# Patient Record
Sex: FEMALE | Race: WHITE | NOT HISPANIC OR LATINO
[De-identification: names, ages, dates, MRNs, and addresses within clinical notes are randomized per-mention and may not be internally consistent; named-entity substitution may affect disease eponyms.]

---

## 2021-04-18 ENCOUNTER — RX RENEWAL (OUTPATIENT)
Age: 56
End: 2021-04-18

## 2021-04-19 ENCOUNTER — NON-APPOINTMENT (OUTPATIENT)
Age: 56
End: 2021-04-19

## 2021-04-19 DIAGNOSIS — J35.01 CHRONIC TONSILLITIS: ICD-10-CM

## 2021-04-19 DIAGNOSIS — R73.01 IMPAIRED FASTING GLUCOSE: ICD-10-CM

## 2021-04-19 DIAGNOSIS — Z87.01 PERSONAL HISTORY OF PNEUMONIA (RECURRENT): ICD-10-CM

## 2021-04-19 DIAGNOSIS — M85.80 OTHER SPECIFIED DISORDERS OF BONE DENSITY AND STRUCTURE, UNSPECIFIED SITE: ICD-10-CM

## 2021-04-19 DIAGNOSIS — Z78.9 OTHER SPECIFIED HEALTH STATUS: ICD-10-CM

## 2021-04-19 DIAGNOSIS — Z80.1 FAMILY HISTORY OF MALIGNANT NEOPLASM OF TRACHEA, BRONCHUS AND LUNG: ICD-10-CM

## 2021-04-19 DIAGNOSIS — Z92.89 PERSONAL HISTORY OF OTHER MEDICAL TREATMENT: ICD-10-CM

## 2021-04-19 DIAGNOSIS — Z81.1 FAMILY HISTORY OF ALCOHOL ABUSE AND DEPENDENCE: ICD-10-CM

## 2021-04-19 DIAGNOSIS — R07.2 PRECORDIAL PAIN: ICD-10-CM

## 2021-04-19 RX ORDER — CETIRIZINE HCL 10 MG
10 TABLET ORAL
Refills: 0 | Status: ACTIVE | COMMUNITY

## 2021-05-08 ENCOUNTER — RX CHANGE (OUTPATIENT)
Age: 56
End: 2021-05-08

## 2021-05-08 ENCOUNTER — RX RENEWAL (OUTPATIENT)
Age: 56
End: 2021-05-08

## 2021-05-19 ENCOUNTER — RX CHANGE (OUTPATIENT)
Age: 56
End: 2021-05-19

## 2021-05-28 ENCOUNTER — RX RENEWAL (OUTPATIENT)
Age: 56
End: 2021-05-28

## 2021-07-05 ENCOUNTER — RX RENEWAL (OUTPATIENT)
Age: 56
End: 2021-07-05

## 2021-08-19 ENCOUNTER — RX RENEWAL (OUTPATIENT)
Age: 56
End: 2021-08-19

## 2021-08-29 ENCOUNTER — RX RENEWAL (OUTPATIENT)
Age: 56
End: 2021-08-29

## 2021-08-30 ENCOUNTER — RX RENEWAL (OUTPATIENT)
Age: 56
End: 2021-08-30

## 2021-10-10 ENCOUNTER — RX RENEWAL (OUTPATIENT)
Age: 56
End: 2021-10-10

## 2021-10-27 ENCOUNTER — RESULT CHARGE (OUTPATIENT)
Age: 56
End: 2021-10-27

## 2021-10-28 PROBLEM — Z00.00 ENCOUNTER FOR PREVENTIVE HEALTH EXAMINATION: Status: ACTIVE | Noted: 2021-10-28

## 2021-10-28 PROBLEM — Z13.6 SCREENING FOR CARDIOVASCULAR CONDITION: Status: ACTIVE | Noted: 2021-10-28

## 2021-10-29 ENCOUNTER — APPOINTMENT (OUTPATIENT)
Dept: FAMILY MEDICINE | Facility: CLINIC | Age: 56
End: 2021-10-29
Payer: COMMERCIAL

## 2021-10-29 ENCOUNTER — NON-APPOINTMENT (OUTPATIENT)
Age: 56
End: 2021-10-29

## 2021-10-29 VITALS
HEIGHT: 62 IN | TEMPERATURE: 98.2 F | BODY MASS INDEX: 29.44 KG/M2 | DIASTOLIC BLOOD PRESSURE: 82 MMHG | HEART RATE: 79 BPM | OXYGEN SATURATION: 98 % | SYSTOLIC BLOOD PRESSURE: 138 MMHG | WEIGHT: 160 LBS

## 2021-10-29 DIAGNOSIS — B00.1 HERPESVIRAL VESICULAR DERMATITIS: ICD-10-CM

## 2021-10-29 DIAGNOSIS — Z87.39 PERSONAL HISTORY OF OTHER DISEASES OF THE MUSCULOSKELETAL SYSTEM AND CONNECTIVE TISSUE: ICD-10-CM

## 2021-10-29 DIAGNOSIS — J01.90 ACUTE SINUSITIS, UNSPECIFIED: ICD-10-CM

## 2021-10-29 DIAGNOSIS — Z13.6 ENCOUNTER FOR SCREENING FOR CARDIOVASCULAR DISORDERS: ICD-10-CM

## 2021-10-29 DIAGNOSIS — K57.90 DIVERTICULOSIS OF INTESTINE, PART UNSPECIFIED, W/OUT PERFORATION OR ABSCESS W/OUT BLEEDING: ICD-10-CM

## 2021-10-29 DIAGNOSIS — Z00.00 ENCOUNTER FOR GENERAL ADULT MEDICAL EXAMINATION W/OUT ABNORMAL FINDINGS: ICD-10-CM

## 2021-10-29 DIAGNOSIS — Z86.69 PERSONAL HISTORY OF OTHER DISEASES OF THE NERVOUS SYSTEM AND SENSE ORGANS: ICD-10-CM

## 2021-10-29 PROCEDURE — 99386 PREV VISIT NEW AGE 40-64: CPT | Mod: 25

## 2021-10-29 PROCEDURE — 93000 ELECTROCARDIOGRAM COMPLETE: CPT

## 2021-10-29 RX ORDER — BUPROPION HYDROCHLORIDE 300 MG/1
300 TABLET, EXTENDED RELEASE ORAL DAILY
Refills: 0 | Status: DISCONTINUED | COMMUNITY
End: 2021-10-29

## 2021-10-29 RX ORDER — TRAMADOL HYDROCHLORIDE 100 MG/1
100 TABLET, COATED ORAL
Refills: 0 | Status: DISCONTINUED | COMMUNITY
End: 2021-10-29

## 2021-10-29 RX ORDER — FLUTICASONE PROPIONATE 50 UG/1
50 SPRAY, METERED NASAL TWICE DAILY
Qty: 1 | Refills: 6 | Status: ACTIVE | COMMUNITY
Start: 2021-10-29 | End: 1900-01-01

## 2021-10-29 NOTE — REVIEW OF SYSTEMS
[Negative] : Heme/Lymph [Earache] : earache [Nasal Discharge] : nasal discharge [Sore Throat] : sore throat [Postnasal Drip] : postnasal drip

## 2021-10-29 NOTE — HISTORY OF PRESENT ILLNESS
[de-identified] : Patient is here for yearly physical. She denies any new complaints . She is UTD with all preventative testing , dentist, Optho and Derm .\par Patient is eating healthy  and exercising . Patient is Covid Vaccinated. \par Patient is fasting for physical labs today\par Pt has had sinus congestion and recurrent cold sore , she takes flonase and claritin

## 2021-10-29 NOTE — HEALTH RISK ASSESSMENT
[Good] : ~his/her~  mood as  good [Yes] : Yes [2 - 4 times a month (2 pts)] : 2-4 times a month (2 points) [3 or 4 (1 pt)] : 3 or 4  (1 point) [Never (0 pts)] : Never (0 points) [Patient reported mammogram was normal] : Patient reported mammogram was normal [Patient reported colonoscopy was normal] : Patient reported colonoscopy was normal [1] : 2) Feeling down, depressed, or hopeless for several days (1) [With Family] : lives with family [# of Members in Household ___] :  household currently consist of [unfilled] member(s) [Employed] : employed [College] : College [] :  [# Of Children ___] : has [unfilled] children [Feels Safe at Home] : Feels safe at home [Reports normal functional visual acuity (ie: able to read med bottle)] : Reports normal functional visual acuity [Smoke Detector] : smoke detector [Carbon Monoxide Detector] : carbon monoxide detector [Safety elements used in home] : safety elements used in home [Seat Belt] :  uses seat belt [Sunscreen] : uses sunscreen [] : No [de-identified] : low level   active  [de-identified] : moderately healthy  diet  [Sexually Active] : not sexually active [Reports changes in hearing] : Reports no changes in hearing [Reports changes in vision] : Reports no changes in vision [Reports changes in dental health] : Reports no changes in dental health [MammogramDate] : 6/2021 [MammogramComments] : dense breast  [PapSmearComments] : s/p hsyterectomy  [PapSmearDate] : >5 years  [BoneDensityDate] : 2020 [BoneDensityComments] : osteopenia  [ColonoscopyDate] : 2021 [FreeTextEntry2] : [facility]

## 2021-10-29 NOTE — PHYSICAL EXAM
[No Acute Distress] : no acute distress [Well Nourished] : well nourished [Well Developed] : well developed [Well-Appearing] : well-appearing [Normal Sclera/Conjunctiva] : normal sclera/conjunctiva [PERRL] : pupils equal round and reactive to light [EOMI] : extraocular movements intact [Normal Outer Ear/Nose] : the outer ears and nose were normal in appearance [Normal Oropharynx] : the oropharynx was normal [No JVD] : no jugular venous distention [No Lymphadenopathy] : no lymphadenopathy [Supple] : supple [Thyroid Normal, No Nodules] : the thyroid was normal and there were no nodules present [No Respiratory Distress] : no respiratory distress  [No Accessory Muscle Use] : no accessory muscle use [Clear to Auscultation] : lungs were clear to auscultation bilaterally [Normal Rate] : normal rate  [Regular Rhythm] : with a regular rhythm [Normal S1, S2] : normal S1 and S2 [No Murmur] : no murmur heard [No Carotid Bruits] : no carotid bruits [No Abdominal Bruit] : a ~M bruit was not heard ~T in the abdomen [No Varicosities] : no varicosities [Pedal Pulses Present] : the pedal pulses are present [No Edema] : there was no peripheral edema [No Palpable Aorta] : no palpable aorta [No Extremity Clubbing/Cyanosis] : no extremity clubbing/cyanosis [Soft] : abdomen soft [Non Tender] : non-tender [Non-distended] : non-distended [No Masses] : no abdominal mass palpated [No HSM] : no HSM [Normal Bowel Sounds] : normal bowel sounds [Normal Posterior Cervical Nodes] : no posterior cervical lymphadenopathy [Normal Anterior Cervical Nodes] : no anterior cervical lymphadenopathy [No CVA Tenderness] : no CVA  tenderness [No Spinal Tenderness] : no spinal tenderness [No Joint Swelling] : no joint swelling [Grossly Normal Strength/Tone] : grossly normal strength/tone [No Rash] : no rash [Coordination Grossly Intact] : coordination grossly intact [No Focal Deficits] : no focal deficits [Normal Gait] : normal gait [Deep Tendon Reflexes (DTR)] : deep tendon reflexes were 2+ and symmetric [Normal Affect] : the affect was normal [Normal Insight/Judgement] : insight and judgment were intact [de-identified] : nasal turbinates edematous , cold sore noted on inner right nostril , TM effusion bilaterally

## 2021-10-29 NOTE — PLAN
[FreeTextEntry1] : Fasting labs were reviewed  today . ECG reviewed and WNL \par I have advised to use flonase , saline , azelastine with decongestants and antihistamines for allergic rhinitis \par Will start lexapro and return in 2-3 months for followup \par Pt will start Valtrex for recurrent cold sores \par Discussed Hyperlipidemia , levels have increased 40 points since 2020, Pt will work harder on diet and exercise and recheck in 6 months \par Advised to undergo any preventative testing that is overdue . \par Patient will continue healthy eating and exercise and followup in one year for PE\par

## 2021-11-08 ENCOUNTER — NON-APPOINTMENT (OUTPATIENT)
Age: 56
End: 2021-11-08

## 2021-12-16 RX ORDER — VALACYCLOVIR 1 G/1
1 TABLET, FILM COATED ORAL DAILY
Qty: 30 | Refills: 1 | Status: ACTIVE | COMMUNITY
Start: 2021-10-29 | End: 1900-01-01

## 2022-01-08 ENCOUNTER — RX RENEWAL (OUTPATIENT)
Age: 57
End: 2022-01-08

## 2022-05-15 ENCOUNTER — FORM ENCOUNTER (OUTPATIENT)
Age: 57
End: 2022-05-15

## 2022-05-16 ENCOUNTER — FORM ENCOUNTER (OUTPATIENT)
Age: 57
End: 2022-05-16

## 2022-05-17 ENCOUNTER — FORM ENCOUNTER (OUTPATIENT)
Age: 57
End: 2022-05-17

## 2022-05-18 ENCOUNTER — NON-APPOINTMENT (OUTPATIENT)
Age: 57
End: 2022-05-18

## 2022-05-20 ENCOUNTER — APPOINTMENT (OUTPATIENT)
Dept: FAMILY MEDICINE | Facility: CLINIC | Age: 57
End: 2022-05-20
Payer: COMMERCIAL

## 2022-05-20 ENCOUNTER — RX RENEWAL (OUTPATIENT)
Age: 57
End: 2022-05-20

## 2022-05-20 VITALS
HEART RATE: 76 BPM | OXYGEN SATURATION: 96 % | DIASTOLIC BLOOD PRESSURE: 80 MMHG | BODY MASS INDEX: 30.36 KG/M2 | WEIGHT: 165 LBS | HEIGHT: 62 IN | TEMPERATURE: 97.6 F | SYSTOLIC BLOOD PRESSURE: 130 MMHG

## 2022-05-20 PROCEDURE — 99214 OFFICE O/P EST MOD 30 MIN: CPT

## 2022-05-20 RX ORDER — FLUTICASONE PROPIONATE 50 UG/1
50 SPRAY, METERED NASAL
Refills: 0 | Status: DISCONTINUED | COMMUNITY
End: 2022-05-20

## 2022-05-20 NOTE — PHYSICAL EXAM
[No Carotid Bruits] : no carotid bruits [No Abdominal Bruit] : a ~M bruit was not heard ~T in the abdomen [No Edema] : there was no peripheral edema [Normal] : no rash [Coordination Grossly Intact] : coordination grossly intact [No Focal Deficits] : no focal deficits [Normal Gait] : normal gait [Normal Affect] : the affect was normal [Normal Insight/Judgement] : insight and judgment were intact

## 2022-05-20 NOTE — ASSESSMENT
[FreeTextEntry1] : Patient is a 58yo female with PMH HTN, HLD, hypothyroidism, depression, anxiety, Vitamin D deficiency who presents to the office for follow up.  \par \par HTN\par - Continue Metoprolol Succinate 50mg daily.\par - BP borderline in office at 130/80, was going to repeat but pt had coughing fit secondary to mask so deferred for now as would likely be elevated from coughing fit.\par - Monitor BP at home, keep log, alert office if too high/too low.\par \par HLD\par - Cholesterol improved since last check in 10/2021.\par - 10 year ASCVD risk 3.5-6.0%.\par - Will continue to work on diet, limit/avoid greasy foods, fried foods, fatty foods, and saturated fat in your diet and try and eat more lean proteins, and increase exercise.\par \par Hypothyroidism\par - TSH mildly elevated, FT4 WNL.\par - Pt denies worsening hypothyroid symptoms.\par - Pt will continue Levothyroxine 25mcg daily and will repeat TSH/FT4 in 1 month, will re-evaluate need for dose adjustment at that time based on blood work results and symptoms.\par \par Depression/Anxiety\par - Depression symptoms improved on Lexapro 10mg daily (just started in 10/2021), however room for improvement.\par - Increase dose of Lexapro to 15mg daily.\par - Insomnia symptoms also intermittently worse, pt would like to increase dose of Trazodone, new RX for Trazodone 150mg sent to pharmacy.\par - RX for Xanax 0.25mg #15 sent to pharmacy, to be used for flight anxiety and travel anxiety sparingly only as needed.  Do not drink alcohol, drive, or operate heavy machinery when taking Xanax as it causes drowsiness.\par \par RTO in 6 months for fasting labs/CPE.\par Call the office or go to the ED immediately if you develop new, worsening or concerning symptoms including high fever, severe headache/worst headache of your life, confusion, dizziness/lightheadedness, loss of consciousness, severe chest pain, difficulty breathing, shortness of breath, severe abdominal pain, excessive vomiting/diarrhea, inability to feel/move the extremities, or any other concerning symptoms.\par

## 2022-05-20 NOTE — HEALTH RISK ASSESSMENT
[Former] : Former [Yes] : Yes [2 - 3 times a week (3 pts)] : 2 - 3  times a week (3 points) [1 or 2 (0 pts)] : 1 or 2 (0 points) [Never (0 pts)] : Never (0 points) [No] : In the past 12 months have you used drugs other than those required for medical reasons? No [No falls in past year] : Patient reported no falls in the past year [1] : 2) Feeling down, depressed, or hopeless for several days (1) [PHQ-2 Positive] : PHQ-2 Positive [Several Days (1)] : 4.) Feeling tired or having little energy? Several days [1/2 of Days or More (2)] : 7.) Trouble concentrating on things, such as reading a newspaper or watching television? Half the days or more [Not at All (0)] : 8.) Moving or speaking so slowly that other people could have noticed, or the opposite, moving or speaking faster than usual? Not at all [Mild] : severity of depression is mild [Not at all] : How difficult have these problems made it for you to do your work, take care of things at home, or get along with people? Not at all [I have developed a follow-up plan documented below in the note.] : I have developed a follow-up plan documented below in the note. [With Patient/Caregiver] : , with patient/caregiver [YearQuit] : 2018 [Audit-CScore] : 3 [de-identified] : active, walks [de-identified] : room for improvement, carbohydrates [KIN4Diocr] : 2 [FreeTextEntry4] : needs.

## 2022-05-20 NOTE — PLAN
[FreeTextEntry1] : See assessment.\par Increase Lexapro to 15mg daily.\par Increase Trazodone to 150mg.\par Repeat thyroid testing in 1 month to evaluate need for medication adjustment.

## 2022-06-28 ENCOUNTER — FORM ENCOUNTER (OUTPATIENT)
Age: 57
End: 2022-06-28

## 2022-07-19 DIAGNOSIS — Z87.898 PERSONAL HISTORY OF OTHER SPECIFIED CONDITIONS: ICD-10-CM

## 2022-07-19 DIAGNOSIS — R53.83 OTHER FATIGUE: ICD-10-CM

## 2022-07-19 DIAGNOSIS — Z12.31 ENCOUNTER FOR SCREENING MAMMOGRAM FOR MALIGNANT NEOPLASM OF BREAST: ICD-10-CM

## 2022-07-20 ENCOUNTER — FORM ENCOUNTER (OUTPATIENT)
Age: 57
End: 2022-07-20

## 2022-07-20 ENCOUNTER — RX CHANGE (OUTPATIENT)
Age: 57
End: 2022-07-20

## 2022-07-20 RX ORDER — AZELASTINE HYDROCHLORIDE 137 UG/1
0.1 SPRAY, METERED NASAL TWICE DAILY
Qty: 1 | Refills: 4 | Status: ACTIVE | COMMUNITY
Start: 2021-10-29 | End: 1900-01-01

## 2022-07-29 ENCOUNTER — NON-APPOINTMENT (OUTPATIENT)
Age: 57
End: 2022-07-29

## 2022-11-02 ENCOUNTER — RESULT CHARGE (OUTPATIENT)
Age: 57
End: 2022-11-02

## 2022-11-03 ENCOUNTER — APPOINTMENT (OUTPATIENT)
Dept: FAMILY MEDICINE | Facility: CLINIC | Age: 57
End: 2022-11-03

## 2022-11-03 ENCOUNTER — FORM ENCOUNTER (OUTPATIENT)
Age: 57
End: 2022-11-03

## 2022-11-03 ENCOUNTER — NON-APPOINTMENT (OUTPATIENT)
Age: 57
End: 2022-11-03

## 2022-11-03 VITALS
WEIGHT: 180 LBS | HEART RATE: 88 BPM | DIASTOLIC BLOOD PRESSURE: 68 MMHG | BODY MASS INDEX: 33.13 KG/M2 | OXYGEN SATURATION: 97 % | SYSTOLIC BLOOD PRESSURE: 122 MMHG | HEIGHT: 62 IN | TEMPERATURE: 97.2 F

## 2022-11-03 PROCEDURE — 93000 ELECTROCARDIOGRAM COMPLETE: CPT

## 2022-11-03 PROCEDURE — 99396 PREV VISIT EST AGE 40-64: CPT | Mod: 25

## 2022-11-03 RX ORDER — ADHESIVE TAPE 3"X 2.3 YD
50 MCG TAPE, NON-MEDICATED TOPICAL
Refills: 0 | Status: DISCONTINUED | COMMUNITY
End: 2022-11-03

## 2022-11-03 RX ORDER — ADALIMUMAB 40MG/0.4ML
40 KIT SUBCUTANEOUS
Qty: 2 | Refills: 0 | Status: ACTIVE | COMMUNITY
Start: 2022-10-21

## 2022-11-03 RX ORDER — ADALIMUMAB 20MG/0.4ML
20 KIT SUBCUTANEOUS
Refills: 0 | Status: DISCONTINUED | COMMUNITY
End: 2022-11-03

## 2022-11-03 NOTE — REVIEW OF SYSTEMS
[Fever] : no fever [Fatigue] : fatigue [Chills] : no chills [Recent Change In Weight] : ~T recent weight change [Suicidal] : not suicidal [Insomnia] : no insomnia [Anxiety] : no anxiety [Depression] : depression [Negative] : Heme/Lymph

## 2022-11-03 NOTE — HISTORY OF PRESENT ILLNESS
[FreeTextEntry1] : CPE. [de-identified] : Patient is a 56yo female with PMH HTN, HLD, hypothyroidism, depression, anxiety, Vitamin D deficiency, ankylosis spondylitis who presents to the office for CPE. \par  \par Last CPE:  10/29/2021, Prabhu Reddy. \par GYN Exam:  s/p total hysterectomy, does not follow regularly.\par Mammogram:  07/2022, BIRADS-2.  Breast US WNL. \par DEXA:  11/2020, normal. \par Colonoscopy:  6 months ago, Dr. Frederick Chan; s/p polypectomy, hemorrhoid ,diverticulosis; repeat 5 years.\par Ophthalmology:  UTD, last visit Dr. Montaño 06/2022; uveitis.\par Dermatology:  UTD.\par Dentist:  UTD.\par Shingles:  will check with Dr. Hendrix, Rheumatology.\par Flu:  plans to receive next week.\par Tdap:  states got recently, unsure exactly when.  Will obtain records. \par PNA:  will discuss with Dr. Hendrix.\par COVID:  Pfizer x2, boosters x2.\par LMP:  s/p Hysterectomy. \par \par Pt states she is always tired.  States she always wants to take a nap after work.  Has history of depression/anxiety, Lexapro was increased from 10mg daily to 1.5mg daily in May but has not noticed this to help her fatigue.\par Pt has intermittent dull headaches, band like.\par \par Pt follows with Dr. Hendrix, Rheumatology, for ankylosis spondylitis.  Has appointment next week.\par Had b/w done prior for Dr. Hendrix's appointment and states ESR and CRP are elevated.  Does not have results with her.\par \par Pt with steady weight gain, 15lb since 05/2022.

## 2022-11-03 NOTE — ASSESSMENT
[FreeTextEntry1] : Patient is a 56yo female with PMH HTN, HLD, hypothyroidism, depression, anxiety, Vitamin D deficiency, ankylosis spondylitis who presents to the office for CPE. \par \par Health Maintenance\par - Due for Shingrix, possible PNA 2/2 hx ankylosis spondylitis, will discuss with Rheumatology.\par - Due for Flu shot, pt plans to take at work next week.\par - UTD with remainder of HCM.\par - EKG NSR, no acute ST/T changes, no arrhythmias.  States she has mild SOB 2/2 mask and weight gain, will consider cardiac evaluation.\par - Pt provided with RX for b/w, plans to have done tomorrow.\par - Weight gain noted, pt encouraged lifestyle modification.\par - Eat plenty of fruits and vegetables, especially deeply colored fruits/vegetables (such as leafy greens, peaches) that are more nutrient-dense.  Continue to work hard on diet and exercise, limiting/avoiding saturated fat, fatty foods, greasy foods, red meats, white flour-based carbohydrates (cookies, cakes, white bread, white rice), and added sugars.  Chose whole grain foods and products made with whole grains over refined grains and white flour-based carbohydrates.  Avoid beverages and food with added sugar.  Limit salt intake to improve blood pressure.  Limit alcohol intake.\par - Try and incorporate 30 minutes of aerobic exercise to your daily routine.\par \par Depression/Anxiety\par - Continue Lexapro 15mg daily for now.\par - Pt to discuss current symptoms of fatigue with Rheumatology, will consider antidepressant adjustment if there are no underlying rheumatologic reasons for fatigue.\par - Consider therapist f/u.\par - Alert office if symptoms worsen.\par \par Hypothyroidism\par - Continue Levothyroxine 25mcg daily.\par - Will check thyroid functioning on labs tomorrow.\par \par HTN\par - BP at goal in office.\par - Continue Metoprolol 50mg daily.\par - Monitor blood pressure at home, keep log, alert office if too high/too low.\par - Alert office if you develop any new, worsening or concerning symptoms including headache, vision changes, dizziness, loss of consciousness, chest pain, shortness of breath, or lower extremity edema.\par \par Continue f/u with Rheumatology for Ankylosis Spondylitis.\par \par Call the office or go to the ED immediately if you develop new, worsening or concerning symptoms including high fever, severe headache/worst headache of your life, confusion, dizziness/lightheadedness, loss of consciousness, severe chest pain, difficulty breathing, shortness of breath, severe abdominal pain, excessive vomiting/diarrhea, inability to feel/move the extremities, or any other concerning symptoms.\par

## 2022-11-03 NOTE — HEALTH RISK ASSESSMENT
[Good] : ~his/her~  mood as  good [Former] : Former [Yes] : Yes [2 - 3 times a week (3 pts)] : 2 - 3  times a week (3 points) [1 or 2 (0 pts)] : 1 or 2 (0 points) [Never (0 pts)] : Never (0 points) [No] : In the past 12 months have you used drugs other than those required for medical reasons? No [No falls in past year] : Patient reported no falls in the past year [0] : 1) Little interest or pleasure doing things: Not at all (0) [1] : 2) Feeling down, depressed, or hopeless for several days (1) [PHQ-2 Negative - No further assessment needed] : PHQ-2 Negative - No further assessment needed [I have developed a follow-up plan documented below in the note.] : I have developed a follow-up plan documented below in the note. [Patient reported mammogram was normal] : Patient reported mammogram was normal [Patient reported colonoscopy was abnormal] : Patient reported colonoscopy was abnormal [HIV test declined] : HIV test declined [Hepatitis C test declined] : Hepatitis C test declined [None] : None [With Family] : lives with family [Employed] : employed [High School] : high school [Single] : single [# Of Children ___] : has [unfilled] children [Feels Safe at Home] : Feels safe at home [Fully functional (bathing, dressing, toileting, transferring, walking, feeding)] : Fully functional (bathing, dressing, toileting, transferring, walking, feeding) [Fully functional (using the telephone, shopping, preparing meals, housekeeping, doing laundry, using] : Fully functional and needs no help or supervision to perform IADLs (using the telephone, shopping, preparing meals, housekeeping, doing laundry, using transportation, managing medications and managing finances) [Smoke Detector] : smoke detector [Carbon Monoxide Detector] : carbon monoxide detector [Safety elements used in home] : safety elements used in home [Seat Belt] :  uses seat belt [Sunscreen] : uses sunscreen [With Patient/Caregiver] : , with patient/caregiver [Reviewed no changes] : Reviewed, no changes [I will adhere to the patient's wishes.] : I will adhere to the patient's wishes. [de-identified] : on and off smoker for years [YearQuit] : 2018 [Audit-CScore] : 3 [de-identified] : active at work, limited formal exercise [de-identified] : well balanced [OTP3Ygdjl] : 1 [EyeExamDate] : 06/2022 [Change in mental status noted] : No change in mental status noted [Language] : denies difficulty with language [Sexually Active] : not sexually active [High Risk Behavior] : no high risk behavior [Reports changes in hearing] : Reports no changes in hearing [Reports changes in vision] : Reports no changes in vision [Reports changes in dental health] : Reports no changes in dental health [Travel to Developing Areas] : does not  travel to developing areas [MammogramDate] : 07/2022 [ColonoscopyDate] : 05/2022 [ColonoscopyComments] : s/p polypectomy, repeat 5 years. 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

## 2022-11-10 ENCOUNTER — NON-APPOINTMENT (OUTPATIENT)
Age: 57
End: 2022-11-10

## 2022-11-15 ENCOUNTER — NON-APPOINTMENT (OUTPATIENT)
Age: 57
End: 2022-11-15

## 2022-11-15 DIAGNOSIS — R73.03 PREDIABETES.: ICD-10-CM

## 2022-12-08 ENCOUNTER — RX ONLY (RX ONLY)
Age: 57
End: 2022-12-08

## 2022-12-08 ENCOUNTER — OFFICE (OUTPATIENT)
Dept: URBAN - METROPOLITAN AREA CLINIC 12 | Facility: CLINIC | Age: 57
Setting detail: OPHTHALMOLOGY
End: 2022-12-08
Payer: COMMERCIAL

## 2022-12-08 DIAGNOSIS — H18.513: ICD-10-CM

## 2022-12-08 DIAGNOSIS — H40.033: ICD-10-CM

## 2022-12-08 DIAGNOSIS — H00.12: ICD-10-CM

## 2022-12-08 DIAGNOSIS — H25.13: ICD-10-CM

## 2022-12-08 DIAGNOSIS — H20.011: ICD-10-CM

## 2022-12-08 PROCEDURE — 92014 COMPRE OPH EXAM EST PT 1/>: CPT | Performed by: OPHTHALMOLOGY

## 2022-12-08 ASSESSMENT — REFRACTION_CURRENTRX
OS_CYLINDER: -0.25
OS_AXIS: 003
OS_ADD: +2.00
OD_ADD: +2.00
OS_OVR_VA: 20/
OD_CYLINDER: -0.50
OS_SPHERE: +2.25
OD_SPHERE: +2.00
OD_OVR_VA: 20/
OD_VPRISM_DIRECTION: PROGS
OD_AXIS: 100
OS_VPRISM_DIRECTION: PROGS

## 2022-12-08 ASSESSMENT — REFRACTION_AUTOREFRACTION
OS_CYLINDER: -0.50
OS_AXIS: 081
OD_AXIS: 106
OS_SPHERE: +3.25
OD_SPHERE: +3.25
OD_CYLINDER: -1.00

## 2022-12-08 ASSESSMENT — KERATOMETRY
OS_K2POWER_DIOPTERS: 46.25
METHOD_AUTO_MANUAL: AUTO
OD_AXISANGLE_DEGREES: 030
OS_AXISANGLE_DEGREES: 096
OS_K1POWER_DIOPTERS: 46.00
OD_K2POWER_DIOPTERS: 46.50
OD_K1POWER_DIOPTERS: 45.75

## 2022-12-08 ASSESSMENT — AXIALLENGTH_DERIVED
OD_AL: 21.718
OS_AL: 21.6358

## 2022-12-08 ASSESSMENT — CORNEAL DYSTROPHY - POSTERIOR
OS_POSTERIORDYSTROPHY: 1+ 2+ GUTTATA
OD_POSTERIORDYSTROPHY: 1+ 2+ GUTTATA

## 2022-12-08 ASSESSMENT — TONOMETRY
OS_IOP_MMHG: 15
OD_IOP_MMHG: 14

## 2022-12-08 ASSESSMENT — VISUAL ACUITY
OS_BCVA: 20/40
OD_BCVA: 20/20-1

## 2022-12-08 ASSESSMENT — CONFRONTATIONAL VISUAL FIELD TEST (CVF)
OS_FINDINGS: FULL
OD_FINDINGS: FULL

## 2022-12-08 ASSESSMENT — SPHEQUIV_DERIVED
OD_SPHEQUIV: 2.75
OS_SPHEQUIV: 3

## 2022-12-15 ENCOUNTER — OFFICE (OUTPATIENT)
Dept: URBAN - METROPOLITAN AREA CLINIC 12 | Facility: CLINIC | Age: 57
Setting detail: OPHTHALMOLOGY
End: 2022-12-15
Payer: COMMERCIAL

## 2022-12-15 DIAGNOSIS — H20.011: ICD-10-CM

## 2022-12-15 DIAGNOSIS — H18.513: ICD-10-CM

## 2022-12-15 DIAGNOSIS — H43.393: ICD-10-CM

## 2022-12-15 DIAGNOSIS — H25.13: ICD-10-CM

## 2022-12-15 DIAGNOSIS — H40.033: ICD-10-CM

## 2022-12-15 PROBLEM — H00.12 CHALAZION; RIGHT LOWER LID: Status: RESOLVED | Noted: 2022-12-08 | Resolved: 2022-12-15

## 2022-12-15 PROCEDURE — 92014 COMPRE OPH EXAM EST PT 1/>: CPT | Performed by: OPHTHALMOLOGY

## 2022-12-15 ASSESSMENT — KERATOMETRY
OS_AXISANGLE_DEGREES: 105
OS_K2POWER_DIOPTERS: 46.25
METHOD_AUTO_MANUAL: AUTO
OD_K2POWER_DIOPTERS: 46.75
OD_K1POWER_DIOPTERS: 46.25
OD_AXISANGLE_DEGREES: 070
OS_K1POWER_DIOPTERS: 46.00

## 2022-12-15 ASSESSMENT — REFRACTION_AUTOREFRACTION
OS_CYLINDER: -0.50
OD_AXIS: 122
OS_AXIS: 080
OS_SPHERE: +3.00
OD_CYLINDER: -1.25
OD_SPHERE: +3.00

## 2022-12-15 ASSESSMENT — VISUAL ACUITY
OS_BCVA: 20/50-1
OD_BCVA: 20/25

## 2022-12-15 ASSESSMENT — REFRACTION_CURRENTRX
OS_ADD: +2.00
OD_SPHERE: +2.00
OS_AXIS: 003
OS_VPRISM_DIRECTION: PROGS
OD_OVR_VA: 20/
OD_CYLINDER: -0.50
OS_SPHERE: +2.25
OD_ADD: +2.00
OS_OVR_VA: 20/
OD_AXIS: 100
OS_CYLINDER: -0.25
OD_VPRISM_DIRECTION: PROGS

## 2022-12-15 ASSESSMENT — AXIALLENGTH_DERIVED
OS_AL: 21.718
OD_AL: 21.7251

## 2022-12-15 ASSESSMENT — CONFRONTATIONAL VISUAL FIELD TEST (CVF)
OD_FINDINGS: FULL
OS_FINDINGS: FULL

## 2022-12-15 ASSESSMENT — SPHEQUIV_DERIVED
OS_SPHEQUIV: 2.75
OD_SPHEQUIV: 2.375

## 2022-12-15 ASSESSMENT — CORNEAL DYSTROPHY - POSTERIOR
OD_POSTERIORDYSTROPHY: 1+ 2+ GUTTATA
OS_POSTERIORDYSTROPHY: 1+ 2+ GUTTATA

## 2022-12-15 ASSESSMENT — TONOMETRY: OS_IOP_MMHG: 12

## 2022-12-16 ENCOUNTER — RX ONLY (RX ONLY)
Age: 57
End: 2022-12-16

## 2022-12-16 ENCOUNTER — OFFICE (OUTPATIENT)
Dept: URBAN - METROPOLITAN AREA CLINIC 88 | Facility: CLINIC | Age: 57
Setting detail: OPHTHALMOLOGY
End: 2022-12-16
Payer: COMMERCIAL

## 2022-12-16 VITALS — HEIGHT: 55 IN

## 2022-12-16 DIAGNOSIS — H20.011: ICD-10-CM

## 2022-12-16 DIAGNOSIS — H35.033: ICD-10-CM

## 2022-12-16 DIAGNOSIS — H43.811: ICD-10-CM

## 2022-12-16 DIAGNOSIS — H43.393: ICD-10-CM

## 2022-12-16 PROCEDURE — 92134 CPTRZ OPH DX IMG PST SGM RTA: CPT | Performed by: OPHTHALMOLOGY

## 2022-12-16 PROCEDURE — 92250 FUNDUS PHOTOGRAPHY W/I&R: CPT | Performed by: OPHTHALMOLOGY

## 2022-12-16 PROCEDURE — 92014 COMPRE OPH EXAM EST PT 1/>: CPT | Performed by: OPHTHALMOLOGY

## 2022-12-16 ASSESSMENT — AXIALLENGTH_DERIVED
OS_AL: 21.718
OD_AL: 21.7251

## 2022-12-16 ASSESSMENT — KERATOMETRY
OD_K2POWER_DIOPTERS: 46.75
OS_K2POWER_DIOPTERS: 46.25
OS_K1POWER_DIOPTERS: 46.00
OD_AXISANGLE_DEGREES: 070
OS_AXISANGLE_DEGREES: 105
METHOD_AUTO_MANUAL: AUTO
OD_K1POWER_DIOPTERS: 46.25

## 2022-12-16 ASSESSMENT — REFRACTION_AUTOREFRACTION
OD_SPHERE: +3.00
OD_CYLINDER: -1.25
OD_AXIS: 122
OS_CYLINDER: -0.50
OS_AXIS: 080
OS_SPHERE: +3.00

## 2022-12-16 ASSESSMENT — CORNEAL DYSTROPHY - POSTERIOR
OS_POSTERIORDYSTROPHY: 1+ 2+ GUTTATA
OD_POSTERIORDYSTROPHY: 1+ 2+ GUTTATA

## 2022-12-16 ASSESSMENT — TONOMETRY: OS_IOP_MMHG: 12

## 2022-12-16 ASSESSMENT — SPHEQUIV_DERIVED
OD_SPHEQUIV: 2.375
OS_SPHEQUIV: 2.75

## 2022-12-16 ASSESSMENT — VISUAL ACUITY
OD_BCVA: 20/20
OS_BCVA: 20/60-1

## 2022-12-16 ASSESSMENT — CONFRONTATIONAL VISUAL FIELD TEST (CVF)
OD_FINDINGS: FULL
OS_FINDINGS: FULL

## 2022-12-22 ENCOUNTER — OFFICE (OUTPATIENT)
Dept: URBAN - METROPOLITAN AREA CLINIC 12 | Facility: CLINIC | Age: 57
Setting detail: OPHTHALMOLOGY
End: 2022-12-22
Payer: COMMERCIAL

## 2022-12-22 DIAGNOSIS — H43.393: ICD-10-CM

## 2022-12-22 DIAGNOSIS — H35.033: ICD-10-CM

## 2022-12-22 DIAGNOSIS — H43.811: ICD-10-CM

## 2022-12-22 DIAGNOSIS — H20.011: ICD-10-CM

## 2022-12-22 PROBLEM — H40.033 NARROW ANGLE GLAUCOMA SUSPECT; BOTH EYES: Status: ACTIVE | Noted: 2022-12-16

## 2022-12-22 PROBLEM — H25.13 CATARACT SENILE NUCLEAR SCLEROSIS; BOTH EYES: Status: ACTIVE | Noted: 2022-12-16

## 2022-12-22 PROCEDURE — 99212 OFFICE O/P EST SF 10 MIN: CPT | Performed by: OPHTHALMOLOGY

## 2022-12-22 ASSESSMENT — REFRACTION_CURRENTRX
OD_ADD: +2.00
OS_OVR_VA: 20/
OD_OVR_VA: 20/
OD_SPHERE: +1.75
OS_VPRISM_DIRECTION: PROGS
OD_VPRISM_DIRECTION: PROGS
OS_AXIS: 017
OS_SPHERE: +2.25
OS_ADD: +2.00
OD_CYLINDER: -0.50
OD_AXIS: 102
OS_CYLINDER: -0.25

## 2022-12-22 ASSESSMENT — CONFRONTATIONAL VISUAL FIELD TEST (CVF)
OS_FINDINGS: FULL
OD_FINDINGS: FULL

## 2022-12-22 ASSESSMENT — KERATOMETRY
OD_K1POWER_DIOPTERS: 46.00
OD_AXISANGLE_DEGREES: 051
METHOD_AUTO_MANUAL: AUTO
OS_K2POWER_DIOPTERS: 46.00
OD_K2POWER_DIOPTERS: 46.50
OS_AXISANGLE_DEGREES: 090
OS_K1POWER_DIOPTERS: 46.00

## 2022-12-22 ASSESSMENT — REFRACTION_AUTOREFRACTION
OD_AXIS: 126
OS_SPHERE: +3.25
OS_AXIS: 077
OD_CYLINDER: -1.25
OD_SPHERE: +2.75
OS_CYLINDER: -0.50

## 2022-12-22 ASSESSMENT — AXIALLENGTH_DERIVED
OD_AL: 21.8867
OS_AL: 21.6744

## 2022-12-22 ASSESSMENT — VISUAL ACUITY
OD_BCVA: 20/25-1
OS_BCVA: 20/50-1

## 2022-12-22 ASSESSMENT — SPHEQUIV_DERIVED
OS_SPHEQUIV: 3
OD_SPHEQUIV: 2.125

## 2022-12-22 ASSESSMENT — TONOMETRY
OS_IOP_MMHG: 12
OD_IOP_MMHG: 11

## 2022-12-22 ASSESSMENT — CORNEAL DYSTROPHY - POSTERIOR
OS_POSTERIORDYSTROPHY: 1+ 2+ GUTTATA
OD_POSTERIORDYSTROPHY: 1+ 2+ GUTTATA

## 2023-01-12 ENCOUNTER — OFFICE (OUTPATIENT)
Dept: URBAN - METROPOLITAN AREA CLINIC 12 | Facility: CLINIC | Age: 58
Setting detail: OPHTHALMOLOGY
End: 2023-01-12
Payer: COMMERCIAL

## 2023-01-12 DIAGNOSIS — H43.393: ICD-10-CM

## 2023-01-12 DIAGNOSIS — H20.011: ICD-10-CM

## 2023-01-12 DIAGNOSIS — H35.033: ICD-10-CM

## 2023-01-12 DIAGNOSIS — H18.513: ICD-10-CM

## 2023-01-12 DIAGNOSIS — H43.811: ICD-10-CM

## 2023-01-12 DIAGNOSIS — H25.13: ICD-10-CM

## 2023-01-12 DIAGNOSIS — H40.033: ICD-10-CM

## 2023-01-12 PROCEDURE — 99213 OFFICE O/P EST LOW 20 MIN: CPT | Performed by: OPHTHALMOLOGY

## 2023-01-12 ASSESSMENT — TONOMETRY
OD_IOP_MMHG: 11
OS_IOP_MMHG: 14

## 2023-01-12 ASSESSMENT — CORNEAL DYSTROPHY - POSTERIOR
OS_POSTERIORDYSTROPHY: 1+ 2+ GUTTATA
OD_POSTERIORDYSTROPHY: 1+ 2+ GUTTATA

## 2023-01-12 ASSESSMENT — CONFRONTATIONAL VISUAL FIELD TEST (CVF)
OD_FINDINGS: FULL
OS_FINDINGS: FULL

## 2023-01-13 ASSESSMENT — VISUAL ACUITY
OS_BCVA: 20/50-1
OD_BCVA: 20/20

## 2023-01-13 ASSESSMENT — AXIALLENGTH_DERIVED
OS_AL: 21.718
OD_AL: 21.9709

## 2023-01-13 ASSESSMENT — REFRACTION_CURRENTRX
OD_AXIS: 102
OS_VPRISM_DIRECTION: PROGS
OS_ADD: +2.25
OD_OVR_VA: 20/
OD_SPHERE: +2.00
OD_VPRISM_DIRECTION: PROGS
OS_CYLINDER: SPH
OS_OVR_VA: 20/
OD_CYLINDER: -0.75
OS_SPHERE: +2.00
OS_AXIS: 000
OD_ADD: +2.25

## 2023-01-13 ASSESSMENT — REFRACTION_AUTOREFRACTION
OS_SPHERE: +3.00
OS_CYLINDER: -0.50
OD_CYLINDER: -0.75
OS_AXIS: 077
OD_AXIS: 118
OD_SPHERE: +2.25

## 2023-01-13 ASSESSMENT — KERATOMETRY
OS_K2POWER_DIOPTERS: 46.25
OS_K1POWER_DIOPTERS: 46.00
OD_K2POWER_DIOPTERS: 46.50
OD_AXISANGLE_DEGREES: 057
METHOD_AUTO_MANUAL: AUTO
OS_AXISANGLE_DEGREES: 104
OD_K1POWER_DIOPTERS: 46.00

## 2023-01-13 ASSESSMENT — SPHEQUIV_DERIVED
OD_SPHEQUIV: 1.875
OS_SPHEQUIV: 2.75

## 2023-02-23 ENCOUNTER — OFFICE (OUTPATIENT)
Dept: URBAN - METROPOLITAN AREA CLINIC 12 | Facility: CLINIC | Age: 58
Setting detail: OPHTHALMOLOGY
End: 2023-02-23
Payer: COMMERCIAL

## 2023-02-23 DIAGNOSIS — H25.13: ICD-10-CM

## 2023-02-23 DIAGNOSIS — H20.011: ICD-10-CM

## 2023-02-23 DIAGNOSIS — H18.513: ICD-10-CM

## 2023-02-23 DIAGNOSIS — H35.033: ICD-10-CM

## 2023-02-23 DIAGNOSIS — H43.811: ICD-10-CM

## 2023-02-23 DIAGNOSIS — H40.033: ICD-10-CM

## 2023-02-23 DIAGNOSIS — H43.393: ICD-10-CM

## 2023-02-23 PROCEDURE — 92014 COMPRE OPH EXAM EST PT 1/>: CPT | Performed by: OPHTHALMOLOGY

## 2023-02-23 ASSESSMENT — REFRACTION_AUTOREFRACTION
OS_SPHERE: +3.25
OS_CYLINDER: -0.75
OD_SPHERE: +3.25
OD_CYLINDER: -1.00
OD_AXIS: 117
OS_AXIS: 073

## 2023-02-23 ASSESSMENT — KERATOMETRY
OD_K1POWER_DIOPTERS: 45.75
OS_K1POWER_DIOPTERS: 44.75
METHOD_AUTO_MANUAL: AUTO
OS_AXISANGLE_DEGREES: 105
OD_AXISANGLE_DEGREES: 052
OS_K2POWER_DIOPTERS: 46.25
OD_K2POWER_DIOPTERS: 46.25

## 2023-02-23 ASSESSMENT — CONFRONTATIONAL VISUAL FIELD TEST (CVF)
OS_FINDINGS: FULL
OD_FINDINGS: FULL

## 2023-02-23 ASSESSMENT — CORNEAL DYSTROPHY - POSTERIOR
OS_POSTERIORDYSTROPHY: 1+ 2+ GUTTATA
OD_POSTERIORDYSTROPHY: 1+ 2+ GUTTATA

## 2023-02-23 ASSESSMENT — REFRACTION_CURRENTRX
OD_SPHERE: +2.00
OD_VPRISM_DIRECTION: PROGS
OS_CYLINDER: SPH
OD_CYLINDER: -0.75
OD_ADD: +2.25
OS_OVR_VA: 20/
OD_AXIS: 102
OS_VPRISM_DIRECTION: PROGS
OS_SPHERE: +2.00
OS_ADD: +2.25
OD_OVR_VA: 20/
OS_AXIS: 000

## 2023-02-23 ASSESSMENT — SPHEQUIV_DERIVED
OS_SPHEQUIV: 2.875
OD_SPHEQUIV: 2.75

## 2023-02-23 ASSESSMENT — VISUAL ACUITY
OS_BCVA: 20/40-2
OD_BCVA: 20/25-

## 2023-02-23 ASSESSMENT — AXIALLENGTH_DERIVED
OS_AL: 21.8724
OD_AL: 21.757

## 2023-06-28 PROBLEM — H35.033 HYPERTENSIVE RETINOPATHY; BOTH EYES: Status: ACTIVE | Noted: 2023-06-28

## 2023-08-24 ENCOUNTER — OFFICE (OUTPATIENT)
Dept: URBAN - METROPOLITAN AREA CLINIC 12 | Facility: CLINIC | Age: 58
Setting detail: OPHTHALMOLOGY
End: 2023-08-24
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
## 2023-08-24 PROCEDURE — NO SHOW FE NO SHOW FEE: Performed by: OPHTHALMOLOGY

## 2023-09-21 ENCOUNTER — OFFICE (OUTPATIENT)
Dept: URBAN - METROPOLITAN AREA CLINIC 12 | Facility: CLINIC | Age: 58
Setting detail: OPHTHALMOLOGY
End: 2023-09-21
Payer: COMMERCIAL

## 2023-09-21 ENCOUNTER — RX ONLY (RX ONLY)
Age: 58
End: 2023-09-21

## 2023-09-21 VITALS — HEIGHT: 55 IN

## 2023-09-21 DIAGNOSIS — H43.811: ICD-10-CM

## 2023-09-21 DIAGNOSIS — H35.033: ICD-10-CM

## 2023-09-21 DIAGNOSIS — H20.011: ICD-10-CM

## 2023-09-21 DIAGNOSIS — H18.513: ICD-10-CM

## 2023-09-21 DIAGNOSIS — H43.393: ICD-10-CM

## 2023-09-21 DIAGNOSIS — H40.033: ICD-10-CM

## 2023-09-21 DIAGNOSIS — H25.13: ICD-10-CM

## 2023-09-21 PROCEDURE — 92250 FUNDUS PHOTOGRAPHY W/I&R: CPT | Performed by: OPHTHALMOLOGY

## 2023-09-21 PROCEDURE — 92014 COMPRE OPH EXAM EST PT 1/>: CPT | Performed by: OPHTHALMOLOGY

## 2023-09-21 PROCEDURE — 92286 ANT SGM IMG I&R SPECLR MIC: CPT | Performed by: OPHTHALMOLOGY

## 2023-09-21 ASSESSMENT — SPHEQUIV_DERIVED
OD_SPHEQUIV: 3
OS_SPHEQUIV: 2.875

## 2023-09-21 ASSESSMENT — CORNEAL DYSTROPHY - POSTERIOR
OD_POSTERIORDYSTROPHY: 1+ 2+ GUTTATA
OS_POSTERIORDYSTROPHY: 1+ 2+ GUTTATA

## 2023-09-21 ASSESSMENT — VISUAL ACUITY
OS_BCVA: 20/25+
OD_BCVA: 20/25-2

## 2023-09-21 ASSESSMENT — CONFRONTATIONAL VISUAL FIELD TEST (CVF)
OD_FINDINGS: FULL
OS_FINDINGS: FULL

## 2023-09-21 ASSESSMENT — REFRACTION_AUTOREFRACTION
OS_SPHERE: +3.25
OD_AXIS: 119
OS_CYLINDER: -0.75
OD_SPHERE: +3.50
OS_AXIS: 079
OD_CYLINDER: -1.00

## 2023-09-21 ASSESSMENT — KERATOMETRY
OD_AXISANGLE_DEGREES: 049
OD_K2POWER_DIOPTERS: 46.25
OD_K1POWER_DIOPTERS: 45.50
OS_K1POWER_DIOPTERS: 45.25
OS_AXISANGLE_DEGREES: 130
OS_K2POWER_DIOPTERS: 46.25
METHOD_AUTO_MANUAL: AUTO

## 2023-09-21 ASSESSMENT — REFRACTION_CURRENTRX
OD_OVR_VA: 20/
OD_SPHERE: +4.00
OS_OVR_VA: 20/
OD_VPRISM_DIRECTION: SV
OD_AXIS: 096
OD_CYLINDER: -0.75
OS_SPHERE: +4.25
OS_VPRISM_DIRECTION: SV
OS_CYLINDER: -0.75
OS_AXIS: 121

## 2023-09-21 ASSESSMENT — AXIALLENGTH_DERIVED
OD_AL: 21.7133
OS_AL: 21.7937

## 2023-09-21 ASSESSMENT — TONOMETRY
OS_IOP_MMHG: 13
OD_IOP_MMHG: 12

## 2023-11-06 ENCOUNTER — APPOINTMENT (OUTPATIENT)
Dept: FAMILY MEDICINE | Facility: CLINIC | Age: 58
End: 2023-11-06
Payer: COMMERCIAL

## 2023-11-06 VITALS
WEIGHT: 181 LBS | BODY MASS INDEX: 33.31 KG/M2 | SYSTOLIC BLOOD PRESSURE: 138 MMHG | OXYGEN SATURATION: 97 % | DIASTOLIC BLOOD PRESSURE: 72 MMHG | TEMPERATURE: 97.1 F | HEART RATE: 88 BPM | HEIGHT: 62 IN

## 2023-11-06 DIAGNOSIS — F41.9 ANXIETY DISORDER, UNSPECIFIED: ICD-10-CM

## 2023-11-06 DIAGNOSIS — R21 RASH AND OTHER NONSPECIFIC SKIN ERUPTION: ICD-10-CM

## 2023-11-06 DIAGNOSIS — I10 ESSENTIAL (PRIMARY) HYPERTENSION: ICD-10-CM

## 2023-11-06 DIAGNOSIS — Z87.891 PERSONAL HISTORY OF NICOTINE DEPENDENCE: ICD-10-CM

## 2023-11-06 DIAGNOSIS — E78.5 HYPERLIPIDEMIA, UNSPECIFIED: ICD-10-CM

## 2023-11-06 DIAGNOSIS — F32.9 MAJOR DEPRESSIVE DISORDER, SINGLE EPISODE, UNSPECIFIED: ICD-10-CM

## 2023-11-06 DIAGNOSIS — M45.9 ANKYLOSING SPONDYLITIS OF UNSPECIFIED SITES IN SPINE: ICD-10-CM

## 2023-11-06 DIAGNOSIS — Z00.00 ENCOUNTER FOR GENERAL ADULT MEDICAL EXAMINATION W/OUT ABNORMAL FINDINGS: ICD-10-CM

## 2023-11-06 DIAGNOSIS — E55.9 VITAMIN D DEFICIENCY, UNSPECIFIED: ICD-10-CM

## 2023-11-06 DIAGNOSIS — E03.8 OTHER SPECIFIED HYPOTHYROIDISM: ICD-10-CM

## 2023-11-06 DIAGNOSIS — R06.02 SHORTNESS OF BREATH: ICD-10-CM

## 2023-11-06 PROCEDURE — 99396 PREV VISIT EST AGE 40-64: CPT | Mod: 25

## 2023-11-06 PROCEDURE — 99213 OFFICE O/P EST LOW 20 MIN: CPT | Mod: 25

## 2023-11-06 RX ORDER — ESCITALOPRAM OXALATE 10 MG/1
10 TABLET ORAL
Qty: 90 | Refills: 3 | Status: ACTIVE | COMMUNITY
Start: 2021-10-29 | End: 1900-01-01

## 2023-11-06 RX ORDER — LEVOTHYROXINE SODIUM 0.03 MG/1
25 TABLET ORAL
Qty: 90 | Refills: 3 | Status: ACTIVE | COMMUNITY
Start: 2021-05-08 | End: 1900-01-01

## 2023-11-06 RX ORDER — TRAZODONE HYDROCHLORIDE 150 MG/1
150 TABLET ORAL
Qty: 90 | Refills: 3 | Status: ACTIVE | COMMUNITY
Start: 2021-08-30 | End: 1900-01-01

## 2024-03-21 ENCOUNTER — OFFICE (OUTPATIENT)
Dept: URBAN - METROPOLITAN AREA CLINIC 12 | Facility: CLINIC | Age: 59
Setting detail: OPHTHALMOLOGY
End: 2024-03-21

## 2024-03-21 PROCEDURE — NO SHOW FE NO SHOW FEE

## 2024-04-05 RX ORDER — ALPRAZOLAM 0.25 MG/1
0.25 TABLET ORAL DAILY
Qty: 10 | Refills: 0 | Status: ACTIVE | COMMUNITY
Start: 1900-01-01 | End: 1900-01-01

## 2024-04-11 ENCOUNTER — OFFICE (OUTPATIENT)
Dept: URBAN - METROPOLITAN AREA CLINIC 12 | Facility: CLINIC | Age: 59
Setting detail: OPHTHALMOLOGY
End: 2024-04-11
Payer: COMMERCIAL

## 2024-04-11 VITALS — HEIGHT: 55 IN

## 2024-04-11 DIAGNOSIS — H43.393: ICD-10-CM

## 2024-04-11 DIAGNOSIS — H35.033: ICD-10-CM

## 2024-04-11 DIAGNOSIS — H25.13: ICD-10-CM

## 2024-04-11 PROBLEM — H35.371 EPIRETINAL MEMBRANE; RIGHT EYE: Status: ACTIVE | Noted: 2024-04-11

## 2024-04-11 PROBLEM — H18.413 ARCUS SENILIS; BOTH EYES: Status: ACTIVE | Noted: 2024-04-11

## 2024-04-11 PROCEDURE — 92014 COMPRE OPH EXAM EST PT 1/>: CPT | Performed by: OPHTHALMOLOGY

## 2024-06-03 PROBLEM — H35.033 HYPERTENSIVE RETINOPATHY; BOTH EYES: Status: ACTIVE | Noted: 2024-06-03

## 2024-07-02 ENCOUNTER — RX RENEWAL (OUTPATIENT)
Age: 59
End: 2024-07-02

## 2024-09-05 ENCOUNTER — OFFICE (OUTPATIENT)
Dept: URBAN - METROPOLITAN AREA CLINIC 12 | Facility: CLINIC | Age: 59
Setting detail: OPHTHALMOLOGY
End: 2024-09-05
Payer: COMMERCIAL

## 2024-09-05 DIAGNOSIS — H43.393: ICD-10-CM

## 2024-09-05 DIAGNOSIS — H90.3: ICD-10-CM

## 2024-09-05 DIAGNOSIS — H25.13: ICD-10-CM

## 2024-09-05 DIAGNOSIS — H20.011: ICD-10-CM

## 2024-09-05 DIAGNOSIS — H43.811: ICD-10-CM

## 2024-09-05 DIAGNOSIS — H35.033: ICD-10-CM

## 2024-09-05 DIAGNOSIS — H40.033: ICD-10-CM

## 2024-09-05 DIAGNOSIS — H18.413: ICD-10-CM

## 2024-09-05 DIAGNOSIS — H18.513: ICD-10-CM

## 2024-09-05 DIAGNOSIS — H35.371: ICD-10-CM

## 2024-09-05 PROCEDURE — 92567 TYMPANOMETRY: CPT

## 2024-09-05 PROCEDURE — 92014 COMPRE OPH EXAM EST PT 1/>: CPT | Performed by: OPHTHALMOLOGY

## 2024-09-05 PROCEDURE — 92557 COMPREHENSIVE HEARING TEST: CPT

## 2024-09-05 PROCEDURE — 92250 FUNDUS PHOTOGRAPHY W/I&R: CPT | Performed by: OPHTHALMOLOGY

## 2024-09-05 ASSESSMENT — CONFRONTATIONAL VISUAL FIELD TEST (CVF)
OD_FINDINGS: FULL
OS_FINDINGS: FULL

## 2024-09-20 ENCOUNTER — RX RENEWAL (OUTPATIENT)
Age: 59
End: 2024-09-20

## 2024-11-04 ENCOUNTER — RX RENEWAL (OUTPATIENT)
Age: 59
End: 2024-11-04

## 2025-02-07 ENCOUNTER — RX RENEWAL (OUTPATIENT)
Age: 60
End: 2025-02-07

## 2025-02-13 ENCOUNTER — APPOINTMENT (OUTPATIENT)
Dept: FAMILY MEDICINE | Facility: CLINIC | Age: 60
End: 2025-02-13
Payer: COMMERCIAL

## 2025-02-13 VITALS
WEIGHT: 175 LBS | BODY MASS INDEX: 32.2 KG/M2 | OXYGEN SATURATION: 95 % | HEIGHT: 62 IN | DIASTOLIC BLOOD PRESSURE: 78 MMHG | SYSTOLIC BLOOD PRESSURE: 128 MMHG | TEMPERATURE: 98.4 F | HEART RATE: 73 BPM

## 2025-02-13 DIAGNOSIS — I10 ESSENTIAL (PRIMARY) HYPERTENSION: ICD-10-CM

## 2025-02-13 DIAGNOSIS — F32.9 MAJOR DEPRESSIVE DISORDER, SINGLE EPISODE, UNSPECIFIED: ICD-10-CM

## 2025-02-13 DIAGNOSIS — M85.80 OTHER SPECIFIED DISORDERS OF BONE DENSITY AND STRUCTURE, UNSPECIFIED SITE: ICD-10-CM

## 2025-02-13 DIAGNOSIS — F41.9 ANXIETY DISORDER, UNSPECIFIED: ICD-10-CM

## 2025-02-13 DIAGNOSIS — R73.03 PREDIABETES.: ICD-10-CM

## 2025-02-13 DIAGNOSIS — E55.9 VITAMIN D DEFICIENCY, UNSPECIFIED: ICD-10-CM

## 2025-02-13 DIAGNOSIS — Z00.00 ENCOUNTER FOR GENERAL ADULT MEDICAL EXAMINATION W/OUT ABNORMAL FINDINGS: ICD-10-CM

## 2025-02-13 DIAGNOSIS — E78.5 HYPERLIPIDEMIA, UNSPECIFIED: ICD-10-CM

## 2025-02-13 DIAGNOSIS — E03.8 OTHER SPECIFIED HYPOTHYROIDISM: ICD-10-CM

## 2025-02-13 DIAGNOSIS — M45.9 ANKYLOSING SPONDYLITIS OF UNSPECIFIED SITES IN SPINE: ICD-10-CM

## 2025-02-13 PROCEDURE — 99396 PREV VISIT EST AGE 40-64: CPT

## 2025-02-13 RX ORDER — GOLIMUMAB 50 MG/4ML
SOLUTION INTRAVENOUS
Refills: 0 | Status: ACTIVE | COMMUNITY

## 2025-02-13 RX ORDER — DULOXETINE HYDROCHLORIDE 20 MG/1
20 CAPSULE, DELAYED RELEASE PELLETS ORAL
Qty: 90 | Refills: 0 | Status: ACTIVE | COMMUNITY
Start: 2025-02-13 | End: 1900-01-01

## 2025-04-07 PROBLEM — H35.033 HYPERTENSIVE RETINOPATHY; BOTH EYES: Status: ACTIVE | Noted: 2025-04-07

## 2025-05-22 ENCOUNTER — OFFICE (OUTPATIENT)
Dept: URBAN - METROPOLITAN AREA CLINIC 12 | Facility: CLINIC | Age: 60
Setting detail: OPHTHALMOLOGY
End: 2025-05-22
Payer: COMMERCIAL

## 2025-05-22 DIAGNOSIS — H18.513: ICD-10-CM

## 2025-05-22 DIAGNOSIS — H20.011: ICD-10-CM

## 2025-05-22 DIAGNOSIS — H25.13: ICD-10-CM

## 2025-05-22 DIAGNOSIS — H35.371: ICD-10-CM

## 2025-05-22 PROCEDURE — 92134 CPTRZ OPH DX IMG PST SGM RTA: CPT | Performed by: OPHTHALMOLOGY

## 2025-05-22 PROCEDURE — 99214 OFFICE O/P EST MOD 30 MIN: CPT | Performed by: OPHTHALMOLOGY

## 2025-05-22 PROCEDURE — 92286 ANT SGM IMG I&R SPECLR MIC: CPT | Performed by: OPHTHALMOLOGY

## 2025-05-22 ASSESSMENT — REFRACTION_CURRENTRX
OD_AXIS: 096
OS_VPRISM_DIRECTION: PROGS
OS_OVR_VA: 20/
OS_OVR_VA: 20/
OS_VPRISM_DIRECTION: SV
OS_ADD: +1.75
OD_SPHERE: +4.00
OD_CYLINDER: -0.75
OD_SPHERE: +2.50
OD_AXIS: 104
OD_OVR_VA: 20/
OD_ADD: +1.75
OS_CYLINDER: -0.25
OS_SPHERE: +4.25
OD_OVR_VA: 20/
OD_VPRISM_DIRECTION: SV
OS_AXIS: 099
OS_SPHERE: +2.50
OS_AXIS: 121
OD_CYLINDER: -0.75
OS_CYLINDER: -0.75
OD_VPRISM_DIRECTION: PROGS

## 2025-05-22 ASSESSMENT — REFRACTION_MANIFEST
OD_VA1: 20/50-2
OD_SPHERE: +2.00
OS_AXIS: 080
OD_AXIS: 110
OS_SPHERE: +3.50
OD_CYLINDER: -2.00
OS_VA1: 20/20
OS_CYLINDER: -0.75

## 2025-05-22 ASSESSMENT — KERATOMETRY
OD_K2POWER_DIOPTERS: 46.25
METHOD_AUTO_MANUAL: AUTO
OD_K1POWER_DIOPTERS: 45.50
OS_AXISANGLE_DEGREES: 155
OS_K2POWER_DIOPTERS: 44.00
OD_AXISANGLE_DEGREES: 016
OS_K1POWER_DIOPTERS: 42.75

## 2025-05-22 ASSESSMENT — REFRACTION_AUTOREFRACTION
OD_SPHERE: +2.00
OS_AXIS: 079
OS_SPHERE: +3.50
OD_CYLINDER: -2.00
OS_CYLINDER: -0.75
OD_AXIS: 110

## 2025-05-22 ASSESSMENT — VISUAL ACUITY
OS_BCVA: 20/70
OD_BCVA: 20/20

## 2025-05-22 ASSESSMENT — CORNEAL DYSTROPHY - POSTERIOR
OS_POSTERIORDYSTROPHY: 1+ 2+ GUTTATA
OD_POSTERIORDYSTROPHY: 1+ 2+ GUTTATA

## 2025-05-22 ASSESSMENT — TONOMETRY: OS_IOP_MMHG: 10

## 2025-05-22 ASSESSMENT — CONFRONTATIONAL VISUAL FIELD TEST (CVF)
OD_FINDINGS: FULL
OS_FINDINGS: FULL

## 2025-06-25 ENCOUNTER — OFFICE (OUTPATIENT)
Dept: URBAN - METROPOLITAN AREA CLINIC 12 | Facility: CLINIC | Age: 60
Setting detail: OPHTHALMOLOGY
End: 2025-06-25
Payer: COMMERCIAL

## 2025-06-25 DIAGNOSIS — H25.11: ICD-10-CM

## 2025-06-25 DIAGNOSIS — H25.13: ICD-10-CM

## 2025-06-25 PROCEDURE — 99213 OFFICE O/P EST LOW 20 MIN: CPT | Performed by: OPHTHALMOLOGY

## 2025-06-25 PROCEDURE — 92136 OPHTHALMIC BIOMETRY: CPT | Performed by: OPHTHALMOLOGY

## 2025-06-25 ASSESSMENT — REFRACTION_CURRENTRX
OS_AXIS: 099
OS_CYLINDER: -0.75
OS_VPRISM_DIRECTION: PROGS
OS_ADD: +1.75
OS_SPHERE: +2.50
OD_SPHERE: +4.00
OD_SPHERE: +2.50
OD_CYLINDER: -0.75
OS_AXIS: 121
OD_OVR_VA: 20/
OD_AXIS: 096
OD_ADD: +1.75
OS_OVR_VA: 20/
OS_SPHERE: +4.25
OD_CYLINDER: -0.75
OS_OVR_VA: 20/
OD_VPRISM_DIRECTION: PROGS
OS_VPRISM_DIRECTION: SV
OD_AXIS: 104
OD_VPRISM_DIRECTION: SV
OS_CYLINDER: -0.25
OD_OVR_VA: 20/

## 2025-06-25 ASSESSMENT — TONOMETRY
OS_IOP_MMHG: 12
OD_IOP_MMHG: 10

## 2025-06-25 ASSESSMENT — REFRACTION_MANIFEST
OD_CYLINDER: -2.00
OS_CYLINDER: -0.75
OS_SPHERE: +3.50
OD_SPHERE: +2.00
OD_VA1: 20/50-2
OS_AXIS: 080
OD_AXIS: 110
OS_VA1: 20/20

## 2025-06-25 ASSESSMENT — REFRACTION_AUTOREFRACTION
OD_SPHERE: +1.50
OD_CYLINDER: -1.50
OS_CYLINDER: -0.50
OS_AXIS: 076
OD_AXIS: 114
OS_SPHERE: +3.25

## 2025-06-25 ASSESSMENT — KERATOMETRY
OD_K1POWER_DIOPTERS: 45.50
OD_AXISANGLE_DEGREES: 026
METHOD_AUTO_MANUAL: AUTO
OS_K1POWER_DIOPTERS: 46.00
OD_K2POWER_DIOPTERS: 46.25
OS_AXISANGLE_DEGREES: 090
OS_K2POWER_DIOPTERS: 46.00

## 2025-06-25 ASSESSMENT — CORNEAL DYSTROPHY - POSTERIOR
OD_POSTERIORDYSTROPHY: 1+ 2+ GUTTATA
OS_POSTERIORDYSTROPHY: 1+ 2+ GUTTATA

## 2025-06-25 ASSESSMENT — VISUAL ACUITY
OD_BCVA: 20/30
OS_BCVA: 20/125

## 2025-06-25 ASSESSMENT — CONFRONTATIONAL VISUAL FIELD TEST (CVF)
OS_FINDINGS: FULL
OD_FINDINGS: FULL

## 2025-06-27 ENCOUNTER — OFFICE (OUTPATIENT)
Dept: URBAN - METROPOLITAN AREA CLINIC 12 | Facility: CLINIC | Age: 60
Setting detail: OPHTHALMOLOGY
End: 2025-06-27
Payer: COMMERCIAL

## 2025-06-27 DIAGNOSIS — H25.13: ICD-10-CM

## 2025-06-27 DIAGNOSIS — Z01.818: ICD-10-CM

## 2025-06-27 PROCEDURE — 99212 OFFICE O/P EST SF 10 MIN: CPT

## 2025-07-07 ENCOUNTER — ASC (OUTPATIENT)
Dept: URBAN - METROPOLITAN AREA SURGERY 8 | Facility: SURGERY | Age: 60
Setting detail: OPHTHALMOLOGY
End: 2025-07-07
Payer: COMMERCIAL

## 2025-07-07 DIAGNOSIS — H25.11: ICD-10-CM

## 2025-07-07 DIAGNOSIS — H52.221: ICD-10-CM

## 2025-07-07 DIAGNOSIS — H57.03: ICD-10-CM

## 2025-07-07 PROCEDURE — FEMTO PRECISION LASER CATARACT SURGERY: Mod: GY | Performed by: OPHTHALMOLOGY

## 2025-07-07 PROCEDURE — 66982 XCAPSL CTRC RMVL CPLX WO ECP: CPT | Mod: RT | Performed by: OPHTHALMOLOGY

## 2025-07-08 ENCOUNTER — OFFICE (OUTPATIENT)
Dept: URBAN - METROPOLITAN AREA CLINIC 12 | Facility: CLINIC | Age: 60
Setting detail: OPHTHALMOLOGY
End: 2025-07-08
Payer: COMMERCIAL

## 2025-07-08 ENCOUNTER — RX ONLY (RX ONLY)
Age: 60
End: 2025-07-08

## 2025-07-08 DIAGNOSIS — Z96.1: ICD-10-CM

## 2025-07-08 PROCEDURE — 99024 POSTOP FOLLOW-UP VISIT: CPT | Performed by: OPTOMETRIST

## 2025-07-08 ASSESSMENT — REFRACTION_AUTOREFRACTION
OD_CYLINDER: -0.50
OD_SPHERE: PL
OS_CYLINDER: -0.25
OD_AXIS: 099
OS_AXIS: 087
OS_SPHERE: +3.25

## 2025-07-08 ASSESSMENT — KERATOMETRY
OS_K2POWER_DIOPTERS: 46.25
OD_AXISANGLE_DEGREES: 004
OS_AXISANGLE_DEGREES: 085
OD_K2POWER_DIOPTERS: 46.00
OD_K1POWER_DIOPTERS: 45.75
OS_K1POWER_DIOPTERS: 46.00

## 2025-07-08 ASSESSMENT — TONOMETRY
OS_IOP_MMHG: 14
OD_IOP_MMHG: 13

## 2025-07-08 ASSESSMENT — CORNEAL DYSTROPHY - POSTERIOR
OD_POSTERIORDYSTROPHY: 1+ 2+ GUTTATA
OS_POSTERIORDYSTROPHY: 1+ 2+ GUTTATA

## 2025-07-08 ASSESSMENT — VISUAL ACUITY
OS_BCVA: 20/40-
OD_BCVA: 20/125

## 2025-07-08 ASSESSMENT — CONFRONTATIONAL VISUAL FIELD TEST (CVF)
OS_FINDINGS: FULL
OD_FINDINGS: FULL

## 2025-07-15 ENCOUNTER — OFFICE (OUTPATIENT)
Dept: URBAN - METROPOLITAN AREA CLINIC 12 | Facility: CLINIC | Age: 60
Setting detail: OPHTHALMOLOGY
End: 2025-07-15
Payer: COMMERCIAL

## 2025-07-15 DIAGNOSIS — H25.12: ICD-10-CM

## 2025-07-15 PROBLEM — Z96.1 PSEUDOPHAKIA ; RIGHT EYE: Status: ACTIVE | Noted: 2025-07-08

## 2025-07-15 PROCEDURE — 92136 OPHTHALMIC BIOMETRY: CPT | Performed by: OPHTHALMOLOGY

## 2025-07-15 ASSESSMENT — CONFRONTATIONAL VISUAL FIELD TEST (CVF)
OS_FINDINGS: FULL
OD_FINDINGS: FULL

## 2025-07-15 ASSESSMENT — KERATOMETRY
OD_K2POWER_DIOPTERS: 46.00
OS_K1POWER_DIOPTERS: 45.75
OS_AXISANGLE_DEGREES: 130
OD_AXISANGLE_DEGREES: 072
OD_K1POWER_DIOPTERS: 45.75
OS_K2POWER_DIOPTERS: 46.00

## 2025-07-15 ASSESSMENT — CORNEAL DYSTROPHY - POSTERIOR
OD_POSTERIORDYSTROPHY: 1+ 2+ GUTTATA
OS_POSTERIORDYSTROPHY: 1+ 2+ GUTTATA

## 2025-07-15 ASSESSMENT — REFRACTION_AUTOREFRACTION
OD_CYLINDER: -0.50
OS_SPHERE: +3.75
OS_AXIS: 083
OD_AXIS: 130
OS_CYLINDER: -0.75
OD_SPHERE: PLANO

## 2025-07-15 ASSESSMENT — VISUAL ACUITY
OD_BCVA: 20/125
OS_BCVA: 20/40

## 2025-07-15 ASSESSMENT — TONOMETRY
OS_IOP_MMHG: 15
OD_IOP_MMHG: 13

## 2025-08-11 ENCOUNTER — ASC (OUTPATIENT)
Dept: URBAN - METROPOLITAN AREA SURGERY 8 | Facility: SURGERY | Age: 60
Setting detail: OPHTHALMOLOGY
End: 2025-08-11
Payer: COMMERCIAL

## 2025-08-11 DIAGNOSIS — H25.12: ICD-10-CM

## 2025-08-11 PROCEDURE — 66984 XCAPSL CTRC RMVL W/O ECP: CPT | Mod: 79,LT | Performed by: OPHTHALMOLOGY

## 2025-08-12 ENCOUNTER — OFFICE (OUTPATIENT)
Dept: URBAN - METROPOLITAN AREA CLINIC 12 | Facility: CLINIC | Age: 60
Setting detail: OPHTHALMOLOGY
End: 2025-08-12
Payer: COMMERCIAL

## 2025-08-12 DIAGNOSIS — Z96.1: ICD-10-CM

## 2025-08-12 PROCEDURE — 99024 POSTOP FOLLOW-UP VISIT: CPT | Performed by: OPTOMETRIST

## 2025-08-12 ASSESSMENT — KERATOMETRY
OD_K2POWER_DIOPTERS: 46.25
OS_K1POWER_DIOPTERS: 45.50
METHOD_AUTO_MANUAL: AUTO
OS_AXISANGLE_DEGREES: 076
OD_K1POWER_DIOPTERS: 45.75
OD_AXISANGLE_DEGREES: 053
OS_K2POWER_DIOPTERS: 46.50

## 2025-08-12 ASSESSMENT — CONFRONTATIONAL VISUAL FIELD TEST (CVF)
OS_FINDINGS: FULL
OD_FINDINGS: FULL

## 2025-08-12 ASSESSMENT — CORNEAL DYSTROPHY - POSTERIOR
OS_POSTERIORDYSTROPHY: 1+ 2+ GUTTATA
OD_POSTERIORDYSTROPHY: 1+ 2+ GUTTATA

## 2025-08-12 ASSESSMENT — REFRACTION_AUTOREFRACTION
OS_SPHERE: +1.25
OD_AXIS: 113
OD_SPHERE: +0.25
OS_CYLINDER: -0.75
OD_CYLINDER: -0.50
OS_AXIS: 137

## 2025-08-12 ASSESSMENT — VISUAL ACUITY
OD_BCVA: 20/20
OS_BCVA: 20/25-1

## 2025-08-20 ENCOUNTER — OFFICE (OUTPATIENT)
Dept: URBAN - METROPOLITAN AREA CLINIC 12 | Facility: CLINIC | Age: 60
Setting detail: OPHTHALMOLOGY
End: 2025-08-20
Payer: COMMERCIAL

## 2025-08-20 DIAGNOSIS — Z96.1: ICD-10-CM

## 2025-08-20 DIAGNOSIS — H40.033: ICD-10-CM

## 2025-08-20 PROCEDURE — 99024 POSTOP FOLLOW-UP VISIT: CPT | Performed by: OPTOMETRIST

## 2025-08-20 ASSESSMENT — REFRACTION_AUTOREFRACTION
OS_CYLINDER: -0.25
OD_AXIS: 116
OD_SPHERE: PLANO
OD_CYLINDER: -0.50
OS_SPHERE: +0.50
OS_AXIS: 077

## 2025-08-20 ASSESSMENT — KERATOMETRY
OS_K2POWER_DIOPTERS: 46.00
OS_AXISANGLE_DEGREES: 078
OS_K1POWER_DIOPTERS: 45.75
OD_K1POWER_DIOPTERS: 46.00
METHOD_AUTO_MANUAL: AUTO
OD_K2POWER_DIOPTERS: 46.25
OD_AXISANGLE_DEGREES: 060

## 2025-08-20 ASSESSMENT — VISUAL ACUITY
OS_BCVA: 20/30
OD_BCVA: 20/20-

## 2025-08-20 ASSESSMENT — CONFRONTATIONAL VISUAL FIELD TEST (CVF)
OD_FINDINGS: FULL
OS_FINDINGS: FULL

## 2025-08-20 ASSESSMENT — TONOMETRY: OS_IOP_MMHG: 10

## 2025-08-20 ASSESSMENT — CORNEAL DYSTROPHY - POSTERIOR
OD_POSTERIORDYSTROPHY: 1+ 2+ GUTTATA
OS_POSTERIORDYSTROPHY: 1+ 2+ GUTTATA